# Patient Record
(demographics unavailable — no encounter records)

---

## 2024-10-10 NOTE — HISTORY OF PRESENT ILLNESS
[de-identified] :  ACACIA SOUTH has a history of sensorineural hearing loss and TMJ. Left ear clogging intermittently lasting minutes but can be repetitive. No perceived change in hearing. No tinnitus. No vertigo. No autophony. Symptoms began in or about June 2024. Previous trial with nasal sprays was not successful.  Also reports seasonal rhinitis [FreeTextEntry1] : 10/10/2024 uncertain changes reported. Still notices left ear fullness in supine positioning.  Not too intrusive. History of positional vertigo but not currently active.

## 2024-10-10 NOTE — PHYSICAL EXAM
[Normal] : no abnormal secretions [FreeTextEntry1] : Procedure: Microscopic Ear Exam  Left ear:  Ear canal intact without inflammation or lesion.   Tympanic membrane intact without inflammation.  Right ear:  Ear canal intact without inflammation or lesion.   Tympanic membrane intact without inflammation.

## 2024-10-10 NOTE — DATA REVIEWED
[de-identified] : In light of the patients current symptoms, Complete audiometry was ordered and completed today. I have interpreted these results and reviewed them in detail with the patient.  Moderate high-frequency hearing loss affecting the left ear greater than the right.  Speech recognition intact bilaterally.  Tympanometry normal bilaterally.

## 2024-10-10 NOTE — CONSULT LETTER
[Please see my note below.] : Please see my note below. [FreeTextEntry2] : Dear HARINDER ACOSTA  [FreeTextEntry1] : Thank you for allowing me to participate in the care of ACACIA SOUTH . Please see the attached visit note.    Prasanna Arceo Otology Medical Director of [facility] Department of Otolaryngology [facility]

## 2024-10-10 NOTE — ASSESSMENT
[FreeTextEntry1] : Intermittent left ear fullness particularly in supine positioning.  This is most consistent with intermittent eustachian tube dysfunction.  I have recommended continued use of saline nasal wash or mist.  Hearing loss is present bilaterally.  Follow-up with annual audiometry recommended.  There is an intermittent history of Benign positional vertigo currently inactive.  I have provided her with a referral for vestibular physical therapy if symptoms recur.